# Patient Record
Sex: MALE | Race: WHITE | NOT HISPANIC OR LATINO | Employment: FULL TIME | URBAN - METROPOLITAN AREA
[De-identification: names, ages, dates, MRNs, and addresses within clinical notes are randomized per-mention and may not be internally consistent; named-entity substitution may affect disease eponyms.]

---

## 2021-11-01 RX ORDER — CETIRIZINE HYDROCHLORIDE 10 MG/1
10 TABLET ORAL DAILY
COMMUNITY

## 2021-11-01 NOTE — PROGRESS NOTES
Subjective   History of Present Illness: Baldomero Maloney is a 46 y.o. male is being seen for consultation today at the request of JAVIER Carlton for numbness in his right arm. He denies neck pain and arm pain. He has been redoing his home. He was using a scraper and doing edging on 10-05-21 and 10-06-21 and he feels that is when he injured himself.  He was also doing some trim work to the walls overhead after he felt the injury.  He reports numbness in his right thumb and index finger but initially it was the first through fourth digits and has improved some.  At one point he had very little motor activity in the right bicep and was having trouble washing his hair and getting his arm up above his head.  He denies any conservative treatment other than nonsteroidal anti-inflammatory agents.  He states at this point the pain is abated he only has numbness in the right thumb and index finger and the strength has recovered greatly although he feels about a 10 pound difference between his right arm and his left arm and he is right-hand dominant.    While in the room and during my examination of the patient I wore a mask and eye protection.  I washed my hands before and after this patient encounter.  The patient was also wearing a mask.    History of Present Illness    The following portions of the patient's history were reviewed and updated as appropriate: allergies, current medications, past family history, past medical history, past social history, past surgical history and problem list.    Review of Systems   Constitutional: Positive for activity change.   HENT: Negative for congestion.    Eyes: Negative for visual disturbance.   Respiratory: Negative for chest tightness and shortness of breath.    Cardiovascular: Negative for chest pain.   Gastrointestinal: Positive for nausea.   Endocrine: Negative for cold intolerance.   Genitourinary: Negative for difficulty urinating.   Musculoskeletal: Negative for neck  "pain and neck stiffness.   Allergic/Immunologic: Negative for environmental allergies.   Neurological: Positive for numbness.   Hematological: Does not bruise/bleed easily.   Psychiatric/Behavioral: Negative for sleep disturbance.       Objective     Vitals:    11/05/21 1154   BP: 126/72   Pulse: 73   Temp: 97.8 °F (36.6 °C)   SpO2: 98%   Weight: 99.3 kg (219 lb)   Height: 180.3 cm (71\")     Body mass index is 30.54 kg/m².      Physical Exam  Neurologic Exam    Physical Exam:    CONSTITUTIONAL: This 46 year old right handed  male appears well developed, well-nourished and in no acute distress.    HEAD & FACE: the head and face are symmetric, normocephalic and atraumatic.    EYES: Inspection of the conjunctivae and lids reveals no swelling, erythema or discharge.  Pupils are round, equal and reactive to light and there is no scleral icterus.    EARS, NOSE, MOUTH & THROAT: On inspection, the ears and nose are within normal limits.    NECK: the neck is supple and symmetric. The trachea is midline with no masses.    PULMONARY: Respiratory effort is normal with no increased work of breathing or signs of respiratory distress.    CARDIOVASCULAR: Pedal pulses are +2/4 bilaterally. Examination of the extremities shows no edema or varicosities.    LYMPHATIC: There is no palpable lymphadenopathy of the neck.    MUSCULOSKELETAL: Gait and station are within normal limits. The spine has normal alignment and range of motion.    SKIN: The skin is warm, dry and intact    NEUROLOGIC:   Cranial Nerves 2-12 intact  Motor strength reveals some weakness in the upper arm on the right with 4+/5 strength as compared to 5/5 on the left.. Muscle bulk and tone reveal his bicep muscle on his right dominant arm is somewhat smaller than the left.  Sensory exam is normal to fine touch to confrontational testing bilaterally  Reflexes on the right side demonstrates 1/4 Triceps Reflex, 0/4 Biceps Reflex, 1/4 Brachioradialis Reflex, 2/4 Knee " Jerk Reflex, 2/4 Ankle Jerk Reflex and no ankle clonus on the right.   Reflexes on the left side demonstrates 2/4 Triceps Reflex, 2/4 Biceps Reflex, 2/4 Brachioradialis Reflex, 2/4 Knee Jerk Reflex, 2/4 Ankle Jerk Reflex and no ankle clonus on the left.  Superficial/Primitive Reflexes: primitive reflexes were absent.  Garcia's, Babinski, and Clonus signs all negative.  No coordination deficit observed.  Radicular testing showed a negative Spurling's maneuver  Cortical function is intact and without deficits. Speech is normal.    PSYCHIATRIC: oriented to person, place and time. Patient's mood and affect are normal.    Assessment/Plan   Independent Review of Radiographic Studies:      I personally reviewed the images from the following studies.    MRI of the cervical spine done at [facility] on October 19, 2021 reveals a disc protrusion at C6-C7 with possible extruded component contacting the ventral cord but no cord compression.  There is right greater than left neuroforaminal narrowing at C6-C7.  At C5-C6 there is a slight disc bulge with borderline to mild central stenosis and only mild right greater than left neuroforaminal narrowing.    Medical Decision Making:      Patient has symptoms of both C6 and C7 radiculopathy in the right upper extremity but he is improving with time.  I suggested physical therapy for the use of modalities, stretching strengthening exercises, and traction.  They can set up home traction if he notices results.  He has an 80% likelihood in resolving the symptoms and avoiding surgery long-term.  I will see him back upon completion of the physical therapy to make sure he has the adequate progress.    As he is recovering and even in the future he should avoid constant overhead activity such as painting ceilings as that tends to exacerbate cervical disc disease and stenosis..    Return in about 4 weeks (around 12/3/2021) for discussion of Physical Therapy results.    Diagnoses and all  orders for this visit:    1. Herniation of cervical intervertebral disc with radiculopathy (Primary)  -     Ambulatory Referral to Physical Therapy Evaluate and treat             Dylan Branch MD FACS [facility]  Neurological Surgery

## 2021-11-05 ENCOUNTER — OFFICE VISIT (OUTPATIENT)
Dept: NEUROSURGERY | Facility: CLINIC | Age: 46
End: 2021-11-05

## 2021-11-05 VITALS
DIASTOLIC BLOOD PRESSURE: 72 MMHG | WEIGHT: 219 LBS | HEIGHT: 71 IN | OXYGEN SATURATION: 98 % | SYSTOLIC BLOOD PRESSURE: 126 MMHG | BODY MASS INDEX: 30.66 KG/M2 | TEMPERATURE: 97.8 F | HEART RATE: 73 BPM

## 2021-11-05 DIAGNOSIS — M50.10 HERNIATION OF CERVICAL INTERVERTEBRAL DISC WITH RADICULOPATHY: Primary | ICD-10-CM

## 2021-11-05 PROCEDURE — 99204 OFFICE O/P NEW MOD 45 MIN: CPT | Performed by: NEUROLOGICAL SURGERY

## 2021-12-03 ENCOUNTER — OFFICE VISIT (OUTPATIENT)
Dept: NEUROSURGERY | Facility: CLINIC | Age: 46
End: 2021-12-03

## 2021-12-03 VITALS
BODY MASS INDEX: 31.33 KG/M2 | WEIGHT: 223.8 LBS | HEIGHT: 71 IN | SYSTOLIC BLOOD PRESSURE: 132 MMHG | OXYGEN SATURATION: 98 % | HEART RATE: 75 BPM | TEMPERATURE: 97.5 F | DIASTOLIC BLOOD PRESSURE: 76 MMHG

## 2021-12-03 DIAGNOSIS — M50.10 HERNIATION OF CERVICAL INTERVERTEBRAL DISC WITH RADICULOPATHY: Primary | ICD-10-CM

## 2021-12-03 PROCEDURE — 99213 OFFICE O/P EST LOW 20 MIN: CPT | Performed by: NEUROLOGICAL SURGERY
